# Patient Record
Sex: FEMALE | Race: WHITE | NOT HISPANIC OR LATINO | Employment: FULL TIME | URBAN - METROPOLITAN AREA
[De-identification: names, ages, dates, MRNs, and addresses within clinical notes are randomized per-mention and may not be internally consistent; named-entity substitution may affect disease eponyms.]

---

## 2018-03-16 DIAGNOSIS — R10.2 PELVIC PAIN IN FEMALE: Primary | ICD-10-CM

## 2018-04-26 DIAGNOSIS — R93.5 ABNORMAL ABDOMINAL X-RAY: Primary | ICD-10-CM

## 2018-04-27 ENCOUNTER — HOSPITAL ENCOUNTER (OUTPATIENT)
Dept: RADIOLOGY | Facility: OTHER | Age: 28
Discharge: HOME | End: 2018-04-27
Attending: NURSE PRACTITIONER
Payer: COMMERCIAL

## 2018-04-27 DIAGNOSIS — R93.5 ABNORMAL ABDOMINAL X-RAY: ICD-10-CM

## 2018-04-27 PROCEDURE — 72190 X-RAY EXAM OF PELVIS: CPT | Mod: TC,FY

## 2018-04-27 PROCEDURE — 72190 X-RAY EXAM OF PELVIS: CPT | Mod: 26,,, | Performed by: RADIOLOGY

## 2018-05-04 ENCOUNTER — TELEPHONE (OUTPATIENT)
Dept: ORTHOPEDICS | Facility: CLINIC | Age: 28
End: 2018-05-04

## 2018-05-04 NOTE — TELEPHONE ENCOUNTER
----- Message from Maximilian Rojas MD sent at 5/4/2018  7:56 AM CDT -----  Regarding: RE: sooner appt   I cannot    ----- Message -----  From: Bridgette Desir MA  Sent: 5/3/2018   4:37 PM  To: Maximilian Rojas MD  Subject: FW: sooner appt                                      ----- Message -----  From: Melanie Reddy  Sent: 5/3/2018   2:37 PM  To: [facility] Staff  Subject: sooner appt                                      Referring doc wanted to know if pt can be seen before 5/21 because pt is leaving for summer. Please call pt if something sooner arrises